# Patient Record
Sex: MALE | Race: WHITE | NOT HISPANIC OR LATINO | URBAN - METROPOLITAN AREA
[De-identification: names, ages, dates, MRNs, and addresses within clinical notes are randomized per-mention and may not be internally consistent; named-entity substitution may affect disease eponyms.]

---

## 2021-08-20 ENCOUNTER — OFFICE (AMBULATORY)
Dept: URBAN - METROPOLITAN AREA PATHOLOGY 4 | Facility: PATHOLOGY | Age: 41
End: 2021-08-20
Payer: COMMERCIAL

## 2021-08-20 ENCOUNTER — ON CAMPUS - OUTPATIENT (AMBULATORY)
Dept: URBAN - METROPOLITAN AREA HOSPITAL 2 | Facility: HOSPITAL | Age: 41
End: 2021-08-20
Payer: COMMERCIAL

## 2021-08-20 VITALS
DIASTOLIC BLOOD PRESSURE: 58 MMHG | RESPIRATION RATE: 16 BRPM | HEART RATE: 63 BPM | SYSTOLIC BLOOD PRESSURE: 115 MMHG | DIASTOLIC BLOOD PRESSURE: 73 MMHG | OXYGEN SATURATION: 97 % | HEART RATE: 75 BPM | DIASTOLIC BLOOD PRESSURE: 95 MMHG | DIASTOLIC BLOOD PRESSURE: 76 MMHG | SYSTOLIC BLOOD PRESSURE: 178 MMHG | SYSTOLIC BLOOD PRESSURE: 130 MMHG | RESPIRATION RATE: 18 BRPM | SYSTOLIC BLOOD PRESSURE: 133 MMHG | HEART RATE: 65 BPM | DIASTOLIC BLOOD PRESSURE: 62 MMHG | OXYGEN SATURATION: 96 % | OXYGEN SATURATION: 100 % | SYSTOLIC BLOOD PRESSURE: 113 MMHG | OXYGEN SATURATION: 99 % | DIASTOLIC BLOOD PRESSURE: 97 MMHG | HEART RATE: 66 BPM | HEART RATE: 68 BPM | HEART RATE: 56 BPM | SYSTOLIC BLOOD PRESSURE: 174 MMHG | WEIGHT: 200 LBS | TEMPERATURE: 97.5 F | HEIGHT: 73 IN | OXYGEN SATURATION: 98 % | OXYGEN SATURATION: 95 %

## 2021-08-20 DIAGNOSIS — K21.9 GASTRO-ESOPHAGEAL REFLUX DISEASE WITHOUT ESOPHAGITIS: ICD-10-CM

## 2021-08-20 LAB
GI HISTOLOGY: A. UNSPECIFIED: (no result)
GI HISTOLOGY: PDF REPORT: (no result)

## 2021-08-20 PROCEDURE — 88305 TISSUE EXAM BY PATHOLOGIST: CPT | Performed by: INTERNAL MEDICINE

## 2021-08-20 PROCEDURE — 43239 EGD BIOPSY SINGLE/MULTIPLE: CPT | Performed by: INTERNAL MEDICINE

## 2023-02-06 ENCOUNTER — LAB (OUTPATIENT)
Dept: FAMILY MEDICINE CLINIC | Facility: CLINIC | Age: 43
End: 2023-02-06
Payer: COMMERCIAL

## 2023-02-06 ENCOUNTER — OFFICE VISIT (OUTPATIENT)
Dept: FAMILY MEDICINE CLINIC | Facility: CLINIC | Age: 43
End: 2023-02-06
Payer: COMMERCIAL

## 2023-02-06 VITALS
HEIGHT: 73 IN | BODY MASS INDEX: 28.97 KG/M2 | OXYGEN SATURATION: 97 % | HEART RATE: 71 BPM | WEIGHT: 218.6 LBS | DIASTOLIC BLOOD PRESSURE: 54 MMHG | RESPIRATION RATE: 18 BRPM | SYSTOLIC BLOOD PRESSURE: 100 MMHG

## 2023-02-06 DIAGNOSIS — M25.552 LEFT HIP PAIN: ICD-10-CM

## 2023-02-06 DIAGNOSIS — G89.29 CHRONIC RIGHT SHOULDER PAIN: ICD-10-CM

## 2023-02-06 DIAGNOSIS — M25.511 CHRONIC RIGHT SHOULDER PAIN: ICD-10-CM

## 2023-02-06 DIAGNOSIS — Z00.00 ANNUAL PHYSICAL EXAM: Primary | ICD-10-CM

## 2023-02-06 DIAGNOSIS — K21.9 GASTROESOPHAGEAL REFLUX DISEASE WITHOUT ESOPHAGITIS: ICD-10-CM

## 2023-02-06 PROCEDURE — 36415 COLL VENOUS BLD VENIPUNCTURE: CPT | Performed by: STUDENT IN AN ORGANIZED HEALTH CARE EDUCATION/TRAINING PROGRAM

## 2023-02-06 PROCEDURE — 85027 COMPLETE CBC AUTOMATED: CPT | Performed by: STUDENT IN AN ORGANIZED HEALTH CARE EDUCATION/TRAINING PROGRAM

## 2023-02-06 PROCEDURE — 80053 COMPREHEN METABOLIC PANEL: CPT | Performed by: STUDENT IN AN ORGANIZED HEALTH CARE EDUCATION/TRAINING PROGRAM

## 2023-02-06 PROCEDURE — 99386 PREV VISIT NEW AGE 40-64: CPT | Performed by: STUDENT IN AN ORGANIZED HEALTH CARE EDUCATION/TRAINING PROGRAM

## 2023-02-06 PROCEDURE — 80061 LIPID PANEL: CPT | Performed by: STUDENT IN AN ORGANIZED HEALTH CARE EDUCATION/TRAINING PROGRAM

## 2023-02-06 RX ORDER — NICOTINE POLACRILEX 4 MG/1
20 GUM, CHEWING ORAL DAILY
Qty: 30 EACH | Refills: 11 | Status: SHIPPED | OUTPATIENT
Start: 2023-02-06

## 2023-02-06 NOTE — PROGRESS NOTES
"Chief Complaint  Chief Complaint   Patient presents with   • Establish Care     Hasn't had a physician in over 10 yrs   • Annual Exam     With labs     Subjective        Lazaro Pedraza is a 42 y.o. male who presents to [facility].    History of Present Illness  Here to establish care and have annual physical.      Exercise: plays softball occasionally, but nothing regular.  Mostly behind computer at work.  Thinks he has R shoulder injury and L hip flexor injury.    Diet: good about vegetables, less so fruit.  Drinks mostly water.    Sleep: no concerns.  Occasionally woke up by kids.    Family history: no colon ca that he is aware of.  Father with pancreatic cancer.  Mother with esophageal cancer.      He has acid reflux, has had a couple scopes done.  Last scope was 2-3 yrs ago he thinks.  He takes omeprazole 20 mg daily.      Previously on allergy shots but quit around 2020.      Works for [employer], is a .      Objective   /54   Pulse 71   Resp 18   Ht 185.4 cm (73\")   Wt 99.2 kg (218 lb 9.6 oz)   SpO2 97%   BMI 28.84 kg/m²     Estimated body mass index is 28.84 kg/m² as calculated from the following:    Height as of this encounter: 185.4 cm (73\").    Weight as of this encounter: 99.2 kg (218 lb 9.6 oz).     Physical Exam   GEN: In no acute distress, non toxic appearing  HEENT: Pupils equal and reactive to light, sclera clear. Mucous membranes moist. Oropharynx without erythema or exudate. No cervical or submandibular lymphadenopathy.  TMs WNL.    CV: Regular rate and rhythm, no murmurs, 2+ peripheral pulses  RESP: Lungs clear to auscultation anteriorly and posteriorly in all lung fields bilaterally.  NEURO: AAO to person, place, and time. CN 2-12 intact grossly.   PSYCH: Affect normal, insight fair     PHQ-2 Depression Screening  Little interest or pleasure in doing things? 0-->not at all   Feeling down, depressed, or hopeless? 0-->not at all   PHQ-2 " Total Score 0      Result Review :              Assessment and Plan     Diagnoses and all orders for this visit:    1. Annual physical exam (Primary)  Overall reassuring exam.  Discussed healthy diet and exercise recs.  Labs as below.    Colon ca screening to start at 46 yo.  Prostate ca screening to start at 49 yo.  F/u in 1 yr or sooner prn.    -     CBC (No Diff)  -     Lipid Panel  -     Comprehensive Metabolic Panel    2. Gastroesophageal reflux disease without esophagitis  -     Omeprazole 20 MG tablet delayed-release; Take 20 mg by mouth Daily.  Dispense: 30 each; Refill: 11    3. Chronic right shoulder pain  Gave handout of band exercises for rotator cuff, likely tendinitis vs partial tear.    4. Left hip pain  Likely R hip flexor strain, gave exercise handout       Follow Up     Return in about 1 year (around 2/6/2024) for Annual physical.

## 2023-02-07 ENCOUNTER — TELEPHONE (OUTPATIENT)
Dept: FAMILY MEDICINE CLINIC | Facility: CLINIC | Age: 43
End: 2023-02-07

## 2023-02-07 LAB
ALBUMIN SERPL-MCNC: 4.5 G/DL (ref 3.5–5.2)
ALBUMIN/GLOB SERPL: 2 G/DL
ALP SERPL-CCNC: 68 U/L (ref 39–117)
ALT SERPL W P-5'-P-CCNC: 46 U/L (ref 1–41)
ANION GAP SERPL CALCULATED.3IONS-SCNC: 9 MMOL/L (ref 5–15)
AST SERPL-CCNC: 23 U/L (ref 1–40)
BILIRUB SERPL-MCNC: 0.4 MG/DL (ref 0–1.2)
BUN SERPL-MCNC: 14 MG/DL (ref 6–20)
BUN/CREAT SERPL: 11.1 (ref 7–25)
CALCIUM SPEC-SCNC: 9.1 MG/DL (ref 8.6–10.5)
CHLORIDE SERPL-SCNC: 104 MMOL/L (ref 98–107)
CHOLEST SERPL-MCNC: 260 MG/DL (ref 0–200)
CO2 SERPL-SCNC: 28 MMOL/L (ref 22–29)
CREAT SERPL-MCNC: 1.26 MG/DL (ref 0.76–1.27)
DEPRECATED RDW RBC AUTO: 42.7 FL (ref 37–54)
EGFRCR SERPLBLD CKD-EPI 2021: 73.5 ML/MIN/1.73
ERYTHROCYTE [DISTWIDTH] IN BLOOD BY AUTOMATED COUNT: 12.8 % (ref 12.3–15.4)
GLOBULIN UR ELPH-MCNC: 2.2 GM/DL
GLUCOSE SERPL-MCNC: 84 MG/DL (ref 65–99)
HCT VFR BLD AUTO: 44.5 % (ref 37.5–51)
HDLC SERPL-MCNC: 34 MG/DL (ref 40–60)
HGB BLD-MCNC: 14.2 G/DL (ref 13–17.7)
LDLC SERPL CALC-MCNC: 172 MG/DL (ref 0–100)
LDLC/HDLC SERPL: 5 {RATIO}
MCH RBC QN AUTO: 29.3 PG (ref 26.6–33)
MCHC RBC AUTO-ENTMCNC: 31.9 G/DL (ref 31.5–35.7)
MCV RBC AUTO: 91.9 FL (ref 79–97)
PLATELET # BLD AUTO: 234 10*3/MM3 (ref 140–450)
PMV BLD AUTO: 11.1 FL (ref 6–12)
POTASSIUM SERPL-SCNC: 4 MMOL/L (ref 3.5–5.2)
PROT SERPL-MCNC: 6.7 G/DL (ref 6–8.5)
RBC # BLD AUTO: 4.84 10*6/MM3 (ref 4.14–5.8)
SODIUM SERPL-SCNC: 141 MMOL/L (ref 136–145)
TRIGL SERPL-MCNC: 280 MG/DL (ref 0–150)
VLDLC SERPL-MCNC: 54 MG/DL (ref 5–40)
WBC NRBC COR # BLD: 5.69 10*3/MM3 (ref 3.4–10.8)

## 2023-02-07 NOTE — TELEPHONE ENCOUNTER
PATIENT STATES DR. COHEN WANTS HIM TO HAVE REPEAT LABS FROM YESTERDAY IN 6 MONTHS, ATTEMPTED TO WARM TRANSFER BUT WAS UNSUCCESSFUL PLEASE CALL PATIENT TO SCHEDULE.     PATIENT> [phone]

## 2023-02-07 NOTE — PROGRESS NOTES
I spoke with Freddy relaying Dr. Long's message and let him know the following about his labs: his kidney function and blood counts were normal.  One of his liver enzymes and his cholesterol were elevated.  I think these two are connected and likely related to diet and exercise.  For both of these I would recommend decreasing fried / greasy foods, saturated fats in whole dairy and red meats, increasing fruits and vegetables, and increasing exercise as he is able with the goal of 150 minutes per week of cardio exercise.  I would like to recheck his cholesterol in 6 months just to make sure we are headed the right direction.  If not, we may discuss starting a medication to help lower his cholesterol with the goal of decreasing risk of stroke and heart disease. He said ok and he will work on diet and exercise.

## 2023-08-18 ENCOUNTER — OFFICE VISIT (OUTPATIENT)
Dept: FAMILY MEDICINE CLINIC | Facility: CLINIC | Age: 43
End: 2023-08-18
Payer: COMMERCIAL

## 2023-08-18 VITALS
RESPIRATION RATE: 16 BRPM | WEIGHT: 224 LBS | OXYGEN SATURATION: 97 % | DIASTOLIC BLOOD PRESSURE: 60 MMHG | HEART RATE: 71 BPM | TEMPERATURE: 97.8 F | HEIGHT: 73 IN | SYSTOLIC BLOOD PRESSURE: 118 MMHG | BODY MASS INDEX: 29.69 KG/M2

## 2023-08-18 DIAGNOSIS — G89.29 CHRONIC BILATERAL LOW BACK PAIN WITHOUT SCIATICA: Primary | ICD-10-CM

## 2023-08-18 DIAGNOSIS — M54.50 CHRONIC BILATERAL LOW BACK PAIN WITHOUT SCIATICA: Primary | ICD-10-CM

## 2023-08-18 PROCEDURE — 99214 OFFICE O/P EST MOD 30 MIN: CPT | Performed by: STUDENT IN AN ORGANIZED HEALTH CARE EDUCATION/TRAINING PROGRAM

## 2023-08-18 RX ORDER — METHOCARBAMOL 750 MG/1
750 TABLET, FILM COATED ORAL NIGHTLY PRN
Qty: 21 TABLET | Refills: 0 | Status: SHIPPED | OUTPATIENT
Start: 2023-08-18

## 2023-08-18 RX ORDER — NAPROXEN 500 MG/1
500 TABLET ORAL 2 TIMES DAILY WITH MEALS
Qty: 28 TABLET | Refills: 0 | Status: SHIPPED | OUTPATIENT
Start: 2023-08-18 | End: 2023-09-01

## 2023-08-18 NOTE — PROGRESS NOTES
"Chief Complaint  Chief Complaint   Patient presents with    Back Pain     Lumber back pain      Subjective        aLzaro Pedraza is a 43 y.o. male who presents to [facility].    History of Present Illness  Freddy is here to discuss lower back pain.  Always had issues with low back pain.  Sitting still it gets stiff.  He irritated it lifting heavy things within the last couple weeks.  He has taken some intermittent hydrocodone.  No pain or radiation down his legs.  He does have some pain down into his upper buttocks.  No numbness / tingling into either foot.  He felt like the vertebra in his back just shifted straight down.      Last XR of his back was pre-covid (so prior to 2019).  He had seen a chiropractor previously that was helpful at the time.      Objective   /60   Pulse 71   Temp 97.8 øF (36.6 øC)   Resp 16   Ht 185.4 cm (73\")   Wt 102 kg (224 lb)   SpO2 97%   BMI 29.55 kg/mý     Estimated body mass index is 29.55 kg/mý as calculated from the following:    Height as of this encounter: 185.4 cm (73\").    Weight as of this encounter: 102 kg (224 lb).     Physical Exam   GEN: In no acute distress, non toxic appearing  MSK: Mild tenderness to palpation in lower lumbar spine.  No palpable abnormalities.  No step-offs.  No tenderness to palpation upper gluteal.  Negative straight leg raise bilaterally.    PHQ-2 Depression Screening  Little interest or pleasure in doing things? 1-->several days   Feeling down, depressed, or hopeless? 1-->several days   PHQ-2 Total Score 5      Result Review :              Assessment and Plan     Diagnoses and all orders for this visit:    1. Chronic bilateral low back pain without sciatica (Primary)  Obtain x-ray to evaluate for possible bony abnormality.  Refer for physical therapy.  Start twice daily naproxen with nightly Robaxin as needed.  Follow-up in 6 weeks, if no improvement at that time consider MRI.  -     XR Spine Lumbar " Complete With Flex & Ext; Future  -     Ambulatory Referral to Physical Therapy Evaluate and treat  -     naproxen (Naprosyn) 500 MG tablet; Take 1 tablet by mouth 2 (Two) Times a Day With Meals for 14 days.  Dispense: 28 tablet; Refill: 0  -     methocarbamol (ROBAXIN) 750 MG tablet; Take 1 tablet by mouth At Night As Needed for Muscle Spasms.  Dispense: 21 tablet; Refill: 0         Follow Up     Return in about 6 weeks (around 9/29/2023) for back pain f/u.

## 2023-08-22 DIAGNOSIS — G89.29 CHRONIC BILATERAL LOW BACK PAIN WITHOUT SCIATICA: ICD-10-CM

## 2023-08-22 DIAGNOSIS — M54.50 CHRONIC BILATERAL LOW BACK PAIN WITHOUT SCIATICA: ICD-10-CM

## 2023-08-29 ENCOUNTER — TREATMENT (OUTPATIENT)
Dept: PHYSICAL THERAPY | Facility: CLINIC | Age: 43
End: 2023-08-29
Payer: COMMERCIAL

## 2023-08-29 DIAGNOSIS — G89.29 CHRONIC BILATERAL LOW BACK PAIN WITHOUT SCIATICA: Primary | ICD-10-CM

## 2023-08-29 DIAGNOSIS — M54.50 CHRONIC BILATERAL LOW BACK PAIN WITHOUT SCIATICA: Primary | ICD-10-CM

## 2023-08-29 PROCEDURE — 97161 PT EVAL LOW COMPLEX 20 MIN: CPT | Performed by: PHYSICAL THERAPIST

## 2023-08-29 PROCEDURE — 97110 THERAPEUTIC EXERCISES: CPT | Performed by: PHYSICAL THERAPIST

## 2023-08-29 PROCEDURE — 97112 NEUROMUSCULAR REEDUCATION: CPT | Performed by: PHYSICAL THERAPIST

## 2023-08-29 NOTE — PROGRESS NOTES
Physical Therapy Initial Evaluation and Plan of Care  [address] [place] [address]      Patient: Lazaro Pedraza   : 1980  Diagnosis/ICD-10 Code:  Chronic bilateral low back pain without sciatica [M54.50, G89.29]  Referring practitioner: Blayne Long DO  Date of Initial Visit: 2023  Today's Date: 2023  Patient seen for 1 sessions               Visit Diagnoses:  Visit Diagnosis       ICD-10-CM ICD-9-CM   1. Chronic bilateral low back pain without sciatica  M54.50 724.2     G89.29 338.29            Subjective Questionnaire: Oswestry: 16%        Subjective 42 yo male with c/o LBP. Pt with a long history of LBP with exacerbation in pain a few months ago when clearing downed trees. Pain has improved somewhat with muscle relaxers. Primary pain is along the central lumbar spine, just above the belt line. Symptoms improve with NSAIDs and muscle relaxers. Symptoms worsen with prolonged positioning, lifting, bending, playing softball. 2/10 pain now and 9/10 at worst. Denies LE symptoms. No history of therapy.  MD follow up:23  Social hx: Plays softball recreationally, deskwork job in IT. Has four young children to care for.        Objective            Active Range of Motion      Lumbar   Normal active range of motion  Flexion: with pain     Strength/Myotome Testing      Lumbar   Left   Normal strength     Right   Normal strength     Tests      Lumbar      Left   Positive passive lumbar instability and passive SLR.      Right   Positive passive lumbar instability and passive SLR.     Positive Gowers sign and piriformis test  Hamstring length limited bilaterally, as is hip IR PROM  Posture is guarded     Assessment & Plan         Assessment  Impairments: abnormal coordination, abnormal gait, abnormal muscle tone, abnormal or restricted ROM, activity intolerance, impaired physical strength, lacks appropriate home exercise program and pain with function   Functional  limitations: carrying objects, lifting, sleeping, walking, pulling, pushing, uncomfortable because of pain, moving in bed, sitting, standing, stooping and unable to perform repetitive tasks   Assessment details: 42 yo male with c/o LBP. Pt is with a good response to treatment this date and would benefit from further evaluation and treatment to address the above impairments.     Prognosis: good     Goals  Plan Goals: Short term goals, 1 week: Tolerate HEP progression.  Voice compliance with activity modification.  Report improvement in symptoms.     Long term goals, 5 weeks: Improve BRENDA score to 6%.  AROM to be wfl and non provocative.  Show good PPT control with stabilization activities.  Rate worst pain at 4/10 with recreational activities.  Independent with HEP.     Plan  Therapy options: will be seen for skilled therapy services  Other planned modality interventions: modalities prn  Planned therapy interventions: abdominal trunk stabilization, body mechanics training, flexibility, functional ROM exercises, home exercise program, manual therapy, neuromuscular re-education, postural training, strengthening, stretching and therapeutic activities  Frequency: Twice weekly  Duration: 5 weeks  Treatment plan discussed with: patient        History # of Personal Factors and/or Comorbidities: LOW (0)  Examination of Body System(s): # of elements: LOW (1-2)  Clinical Presentation: STABLE   Clinical Decision Making: LOW       Timed:                                                                           Manual Therapy:                 mins  76642;                  Therapeutic Exercise:    23     mins  79998;     Neuromuscular Amy:    8    mins  47039;    Therapeutic Activity:           mins  61743;     Gait Training:                      mins  10381;     Ultrasound:                          mins  42274;    Ionto                                   mins   77439  Self Care                            mins   53460      Un-Timed:  Electrical Stimulation:         mins  80072 ( );  Traction                               mins 86136  Low Eval                        15     Mins  64581  Mod Eval                             Mins  55758  High Eval                            Mins  22263  Re-Eval                               mins  77277           Timed Treatment:   31   mins   Total Treatment:     46   mins       PT SIGNATURE: Akash Castro PT, DPT, OCS  [place] license: [id number]  DATE TREATMENT INITIATED: 8/29/2023     Certification Period: @TDA @thru 11/26/2023  I certify that the therapy services are furnished while this patient is under my care.  The services outlined above are required for this patient and will be reviewed every 90 days.                PHYSICIAN: _____________________________                          Blayne Long, DO                                           DATE:      Please sign and return via fax to [unfilled] . Thank you, [facility].

## 2023-09-06 ENCOUNTER — TREATMENT (OUTPATIENT)
Dept: PHYSICAL THERAPY | Facility: CLINIC | Age: 43
End: 2023-09-06
Payer: COMMERCIAL

## 2023-09-06 DIAGNOSIS — M54.50 CHRONIC BILATERAL LOW BACK PAIN WITHOUT SCIATICA: Primary | ICD-10-CM

## 2023-09-06 DIAGNOSIS — G89.29 CHRONIC BILATERAL LOW BACK PAIN WITHOUT SCIATICA: Primary | ICD-10-CM

## 2023-09-06 PROCEDURE — 97110 THERAPEUTIC EXERCISES: CPT | Performed by: PHYSICAL THERAPIST

## 2023-09-06 PROCEDURE — 97112 NEUROMUSCULAR REEDUCATION: CPT | Performed by: PHYSICAL THERAPIST

## 2023-09-06 NOTE — PROGRESS NOTES
Physical Therapy Daily Treatment Note  [facility]  [address] [place] [address]     Patient: Lazaro Pedraza   : 1980   Referring practitioner: Blayne Long DO  Date of initial visit: Type: THERAPY  Noted: 2023   Today's date: 2023   Patient seen for 2 visits    Visit Diagnoses:    ICD-10-CM ICD-9-CM   1. Chronic bilateral low back pain without sciatica  M54.50 724.2    G89.29 338.29       Subjective   Pt reports: Exacerbation in central pain after sitting on a bucket, tossing softballs for batting practice last night. HEP going well.      Objective     See Exercise, Manual, and Modality Logs for complete treatment.     Patient Education: HEP    Assessment/Plan Less pain post visit, tolerating progression.      Progress per Plan of Care            Timed:         Manual Therapy:         mins  48518;     Therapeutic Exercise:    23     mins  34180;     Neuromuscular Amy:    8    mins  62831;    Therapeutic Activity:          mins  65031;     Gait Training:           mins  13700;     Ultrasound:          mins  57010;    Ionto                                   mins   14722  Self Care                            mins   38962    Un-Timed:  Electrical Stimulation:         mins  71892 ( );  Traction          mins 58157  Low Eval          Mins  09736  Mod Eval          Mins  19190  High Eval                            Mins  07577  Re-Eval                               mins  01644    Timed Treatment:   31   mins   Total Treatment:     31   mins      Akash Castro, PT, DPT, OCS  [place] license: [id number]  Physical Therapist

## 2023-09-13 ENCOUNTER — TREATMENT (OUTPATIENT)
Dept: PHYSICAL THERAPY | Facility: CLINIC | Age: 43
End: 2023-09-13
Payer: COMMERCIAL

## 2023-09-13 DIAGNOSIS — G89.29 CHRONIC BILATERAL LOW BACK PAIN WITHOUT SCIATICA: Primary | ICD-10-CM

## 2023-09-13 DIAGNOSIS — M54.50 CHRONIC BILATERAL LOW BACK PAIN WITHOUT SCIATICA: Primary | ICD-10-CM

## 2023-09-13 PROCEDURE — 97112 NEUROMUSCULAR REEDUCATION: CPT | Performed by: PHYSICAL THERAPIST

## 2023-09-13 PROCEDURE — 97140 MANUAL THERAPY 1/> REGIONS: CPT | Performed by: PHYSICAL THERAPIST

## 2023-09-13 PROCEDURE — 97110 THERAPEUTIC EXERCISES: CPT | Performed by: PHYSICAL THERAPIST

## 2023-09-13 NOTE — PROGRESS NOTES
Physical Therapy Daily Treatment Note  [facility]  [address] [place] [address]     Patient: Lazaro Pedraza   : 1980   Referring practitioner: Blayne Long DO  Date of initial visit: Type: THERAPY  Noted: 2023   Today's date: 2023   Patient seen for 3 visits    Visit Diagnoses:    ICD-10-CM ICD-9-CM   1. Chronic bilateral low back pain without sciatica  M54.50 724.2    G89.29 338.29       Subjective   Pt reports: Central belt line pain today. HEP going well but still challenged with PPT.      Objective     See Exercise, Manual, and Modality Logs for complete treatment.     Patient Education: HEP    Assessment/Plan Fatigued post visit, tolerating progression well.      Progress per Plan of Care            Timed:         Manual Therapy:    8     mins  74920;     Therapeutic Exercise:    10     mins  41679;     Neuromuscular Amy:    15    mins  90221;    Therapeutic Activity:          mins  12701;     Gait Training:           mins  72401;     Ultrasound:          mins  80419;    Ionto                                   mins   50683  Self Care                            mins   22347    Un-Timed:  Electrical Stimulation:         mins  66437 ( );  Traction          mins 89685  Low Eval          Mins  54148  Mod Eval          Mins  44901  High Eval                            Mins  40365  Re-Eval                               mins  06791    Timed Treatment:   33   mins   Total Treatment:     33   mins      Akash Castro, PT, DPT, OCS  [place] license: [id number]  Physical Therapist

## 2023-09-18 ENCOUNTER — TREATMENT (OUTPATIENT)
Dept: PHYSICAL THERAPY | Facility: CLINIC | Age: 43
End: 2023-09-18
Payer: COMMERCIAL

## 2023-09-18 DIAGNOSIS — M54.50 CHRONIC BILATERAL LOW BACK PAIN WITHOUT SCIATICA: Primary | ICD-10-CM

## 2023-09-18 DIAGNOSIS — G89.29 CHRONIC BILATERAL LOW BACK PAIN WITHOUT SCIATICA: Primary | ICD-10-CM

## 2023-09-18 PROCEDURE — 97112 NEUROMUSCULAR REEDUCATION: CPT | Performed by: PHYSICAL THERAPIST

## 2023-09-18 PROCEDURE — 97110 THERAPEUTIC EXERCISES: CPT | Performed by: PHYSICAL THERAPIST

## 2023-09-18 NOTE — PROGRESS NOTES
Physical Therapy Daily Treatment Note  [facility]  [address] [place] [address]     Patient: Lazaro Pedraza   : 1980   Referring practitioner: Blayne Long DO  Date of initial visit: Type: THERAPY  Noted: 2023   Today's date: 2023   Patient seen for 4 visits    Visit Diagnoses:    ICD-10-CM ICD-9-CM   1. Chronic bilateral low back pain without sciatica  M54.50 724.2    G89.29 338.29       Subjective   Pt reports: LBP improving, HEP going well.      Objective     See Exercise, Manual, and Modality Logs for complete treatment.     Patient Education: HEP    Assessment/Plan Fatigued post visit. Tolerating progression well.      Progress per Plan of Care            Timed:         Manual Therapy:         mins  56106;     Therapeutic Exercise:    25     mins  86021;     Neuromuscular Amy:    15    mins  92696;    Therapeutic Activity:          mins  79361;     Gait Training:           mins  46945;     Ultrasound:          mins  14804;    Ionto                                   mins   48281  Self Care                            mins   80183    Un-Timed:  Electrical Stimulation:         mins  82756 ( );  Traction          mins 20006  Low Eval          Mins  33386  Mod Eval          Mins  80756  High Eval                            Mins  20597  Re-Eval                               mins  43838    Timed Treatment:   40   mins   Total Treatment:     40   mins      Akash Castro PT, DPT, OCS  [place] license: [id number]  Physical Therapist

## 2023-09-27 ENCOUNTER — TREATMENT (OUTPATIENT)
Dept: PHYSICAL THERAPY | Facility: CLINIC | Age: 43
End: 2023-09-27
Payer: COMMERCIAL

## 2023-09-27 DIAGNOSIS — M54.50 CHRONIC BILATERAL LOW BACK PAIN WITHOUT SCIATICA: Primary | ICD-10-CM

## 2023-09-27 DIAGNOSIS — G89.29 CHRONIC BILATERAL LOW BACK PAIN WITHOUT SCIATICA: Primary | ICD-10-CM

## 2023-09-27 PROCEDURE — 97110 THERAPEUTIC EXERCISES: CPT | Performed by: PHYSICAL THERAPIST

## 2023-09-27 PROCEDURE — 97112 NEUROMUSCULAR REEDUCATION: CPT | Performed by: PHYSICAL THERAPIST

## 2023-09-27 NOTE — PROGRESS NOTES
Re-Assessment / Re-Certification        Patient: Lazaro Pedraza   : 1980  Diagnosis/ICD-10 Code:  Chronic bilateral low back pain without sciatica [M54.50, G89.29]  Referring practitioner: Blayne Long DO  Date of Initial Visit: Type: THERAPY  Noted: 2023  Today's Date: 2023  Patient seen for 5 sessions      Subjective:     Subjective Questionnaire: Oswestry: 12%  Clinical Progress: improved  Home Program Compliance: Yes  Treatment has included: therapeutic exercise, neuromuscular re-education, and manual therapy    Subjective Pt now rates worst pain at 2/10, primarily with prolonged sitting. Pt feels he is independent with his HEP to this point.  Objective   Active Range of Motion      Lumbar   Normal active range of motion        Tests      Lumbar      Left   Positive passive lumbar instability and passive SLR.      Right   Positive passive lumbar instability and passive SLR.     Negative Gowers sign and piriformis test  Hamstring length and hip mobility wfl  Assessment/Plan  Pt has made good progress to this point re: mobility, flexibility, and reported pain. Pt feels he is ready to try independent HEP.  Short term goals, 1 week: Tolerate HEP progression. met  Voice compliance with activity modification. met  Report improvement in symptoms. met     Long term goals, 5 weeks: Improve BRENDA score to 6%. progressing  AROM to be wfl and non provocative. met  Show good PPT control with stabilization activities. met  Rate worst pain at 4/10 with recreational activities. met  Independent with HEP.met    Pending PCP follow up, pt to try 2-3 weeks of independent HEP then follow up here if needed. Will DC if this goes well.    Akash Castro, PT, DPT, OCS  [place] license: [id number]  Physical Therapist          Timed:         Manual Therapy:         mins  52730;     Therapeutic Exercise:    23     mins  00990;     Neuromuscular Amy:    10    mins  10452;    Therapeutic Activity:          mins  48295;      Gait Training:           mins  18326;     Ultrasound:          mins  85985;    Ionto                                   mins   34533  Self Care                            mins   29478    Un-Timed:  Electrical Stimulation:         mins  73899 ( );  Traction          mins 75771  Low Eval          Mins  44935  Mod Eval          Mins  69246  High Eval                            Mins  22556  Re-Eval                               mins  03202      Timed Treatment:   33   mins   Total Treatment:     33   mins

## 2023-09-29 ENCOUNTER — LAB (OUTPATIENT)
Dept: FAMILY MEDICINE CLINIC | Facility: CLINIC | Age: 43
End: 2023-09-29
Payer: COMMERCIAL

## 2023-09-29 ENCOUNTER — OFFICE VISIT (OUTPATIENT)
Dept: FAMILY MEDICINE CLINIC | Facility: CLINIC | Age: 43
End: 2023-09-29
Payer: COMMERCIAL

## 2023-09-29 VITALS
OXYGEN SATURATION: 97 % | WEIGHT: 221.8 LBS | HEART RATE: 58 BPM | DIASTOLIC BLOOD PRESSURE: 67 MMHG | SYSTOLIC BLOOD PRESSURE: 112 MMHG | RESPIRATION RATE: 18 BRPM | HEIGHT: 73 IN | BODY MASS INDEX: 29.4 KG/M2

## 2023-09-29 DIAGNOSIS — G47.10 HYPERSOMNIA: ICD-10-CM

## 2023-09-29 DIAGNOSIS — G89.29 CHRONIC BILATERAL LOW BACK PAIN WITHOUT SCIATICA: Primary | ICD-10-CM

## 2023-09-29 DIAGNOSIS — R74.8 ELEVATED LIVER ENZYMES: ICD-10-CM

## 2023-09-29 DIAGNOSIS — R53.83 OTHER FATIGUE: ICD-10-CM

## 2023-09-29 DIAGNOSIS — R06.83 SNORING: ICD-10-CM

## 2023-09-29 DIAGNOSIS — E78.2 MIXED HYPERLIPIDEMIA: ICD-10-CM

## 2023-09-29 DIAGNOSIS — M54.50 CHRONIC BILATERAL LOW BACK PAIN WITHOUT SCIATICA: Primary | ICD-10-CM

## 2023-09-29 PROCEDURE — 80053 COMPREHEN METABOLIC PANEL: CPT | Performed by: STUDENT IN AN ORGANIZED HEALTH CARE EDUCATION/TRAINING PROGRAM

## 2023-09-29 PROCEDURE — 84443 ASSAY THYROID STIM HORMONE: CPT | Performed by: STUDENT IN AN ORGANIZED HEALTH CARE EDUCATION/TRAINING PROGRAM

## 2023-09-29 PROCEDURE — 84403 ASSAY OF TOTAL TESTOSTERONE: CPT | Performed by: STUDENT IN AN ORGANIZED HEALTH CARE EDUCATION/TRAINING PROGRAM

## 2023-09-29 PROCEDURE — 36415 COLL VENOUS BLD VENIPUNCTURE: CPT

## 2023-09-29 PROCEDURE — 99214 OFFICE O/P EST MOD 30 MIN: CPT | Performed by: STUDENT IN AN ORGANIZED HEALTH CARE EDUCATION/TRAINING PROGRAM

## 2023-09-29 PROCEDURE — 80061 LIPID PANEL: CPT | Performed by: STUDENT IN AN ORGANIZED HEALTH CARE EDUCATION/TRAINING PROGRAM

## 2023-09-29 RX ORDER — IBUPROFEN 200 MG
TABLET ORAL
COMMUNITY
Start: 2023-09-14

## 2023-09-29 NOTE — PROGRESS NOTES
"Chief Complaint  Chief Complaint   Patient presents with    Back Pain     Subjective        Lazaro Pedraza is a 43 y.o. male who presents to [facility].    History of Present Illness  Seen on 8/18 for chronic low back pain.  XR was unremarkable.  He was given naproxen w/ nightly robaxin and referred to physical therapy.  Pain is improved with PT.  If he sits for long periods of time it starts to bother him.      His cholesterol was elevated in February along with one of his liver enzymes.  We are going to recheck these today.  He is also reporting significant fatigue and would like his thyroid and testosterone level checked.  He is always tired.  Could lay down at any point during the day and take a nap.  He does snore.  No pauses in breathing that his wife has mentioned.  He doesn't feel well rested when he wakes up in the morning.      Objective   /67   Pulse 58   Resp 18   Ht 185.4 cm (73\")   Wt 101 kg (221 lb 12.8 oz)   SpO2 97%   BMI 29.26 kg/m²     Estimated body mass index is 29.26 kg/m² as calculated from the following:    Height as of this encounter: 185.4 cm (73\").    Weight as of this encounter: 101 kg (221 lb 12.8 oz).     Physical Exam   GEN: In no acute distress, non toxic appearing  NEURO: AAO to person, place, and time. CN 2-12 intact grossly.  PSYCH: Affect normal, insight fair     Result Review :              Assessment and Plan     Diagnoses and all orders for this visit:    1. Chronic bilateral low back pain without sciatica (Primary)  XR reassuring.  Improving with PT and HEP.  Continue HEP.  Update me / PT if any worsening.    2. Mixed hyperlipidemia  Recheck lipids today, he is fasting.    3. Elevated liver enzymes  Recheck CMP today.    4. Other fatigue  Check TSH and testosterone.    If normal consider home sleep study.        UPDATE: labs returned normal other than cholesterol which is unlikely to contribute to fatigue.  I am suspicious of " sleep apnea.  Will order home sleep study as we discussed.

## 2023-09-30 LAB
ALBUMIN SERPL-MCNC: 4.3 G/DL (ref 3.5–5.2)
ALBUMIN/GLOB SERPL: 1.7 G/DL
ALP SERPL-CCNC: 65 U/L (ref 39–117)
ALT SERPL W P-5'-P-CCNC: 33 U/L (ref 1–41)
ANION GAP SERPL CALCULATED.3IONS-SCNC: 9.8 MMOL/L (ref 5–15)
AST SERPL-CCNC: 25 U/L (ref 1–40)
BILIRUB SERPL-MCNC: 0.6 MG/DL (ref 0–1.2)
BUN SERPL-MCNC: 13 MG/DL (ref 6–20)
BUN/CREAT SERPL: 11.8 (ref 7–25)
CALCIUM SPEC-SCNC: 9.3 MG/DL (ref 8.6–10.5)
CHLORIDE SERPL-SCNC: 104 MMOL/L (ref 98–107)
CHOLEST SERPL-MCNC: 211 MG/DL (ref 0–200)
CO2 SERPL-SCNC: 26.2 MMOL/L (ref 22–29)
CREAT SERPL-MCNC: 1.1 MG/DL (ref 0.76–1.27)
EGFRCR SERPLBLD CKD-EPI 2021: 86 ML/MIN/1.73
GLOBULIN UR ELPH-MCNC: 2.6 GM/DL
GLUCOSE SERPL-MCNC: 78 MG/DL (ref 65–99)
HDLC SERPL-MCNC: 34 MG/DL (ref 40–60)
LDLC SERPL CALC-MCNC: 148 MG/DL (ref 0–100)
LDLC/HDLC SERPL: 4.26 {RATIO}
POTASSIUM SERPL-SCNC: 3.9 MMOL/L (ref 3.5–5.2)
PROT SERPL-MCNC: 6.9 G/DL (ref 6–8.5)
SODIUM SERPL-SCNC: 140 MMOL/L (ref 136–145)
TESTOST SERPL-MCNC: 405 NG/DL (ref 249–836)
TRIGL SERPL-MCNC: 161 MG/DL (ref 0–150)
TSH SERPL DL<=0.05 MIU/L-ACNC: 2.25 UIU/ML (ref 0.27–4.2)
VLDLC SERPL-MCNC: 29 MG/DL (ref 5–40)

## 2023-10-03 DIAGNOSIS — G89.29 CHRONIC BILATERAL LOW BACK PAIN WITHOUT SCIATICA: ICD-10-CM

## 2023-10-03 DIAGNOSIS — M54.50 CHRONIC BILATERAL LOW BACK PAIN WITHOUT SCIATICA: ICD-10-CM

## 2023-10-03 RX ORDER — NAPROXEN 500 MG/1
TABLET ORAL
Qty: 28 TABLET | Refills: 0 | OUTPATIENT
Start: 2023-10-03

## 2023-10-23 DIAGNOSIS — R06.83 SNORING: ICD-10-CM

## 2023-10-23 DIAGNOSIS — E66.3 OVERWEIGHT WITH BODY MASS INDEX (BMI) 25.0-29.9: ICD-10-CM

## 2023-10-23 DIAGNOSIS — G47.8 NON-RESTORATIVE SLEEP: ICD-10-CM

## 2023-10-23 DIAGNOSIS — G47.10 HYPERSOMNIA: Primary | ICD-10-CM

## 2023-11-08 DIAGNOSIS — R06.83 SNORING: ICD-10-CM

## 2023-11-08 DIAGNOSIS — G47.10 HYPERSOMNIA: Primary | ICD-10-CM

## 2023-11-08 DIAGNOSIS — E66.3 OVERWEIGHT WITH BODY MASS INDEX (BMI) 25.0-29.9: ICD-10-CM

## 2023-11-08 DIAGNOSIS — G47.8 NON-RESTORATIVE SLEEP: ICD-10-CM

## 2024-02-12 ENCOUNTER — LAB (OUTPATIENT)
Dept: FAMILY MEDICINE CLINIC | Facility: CLINIC | Age: 44
End: 2024-02-12
Payer: COMMERCIAL

## 2024-02-12 ENCOUNTER — OFFICE VISIT (OUTPATIENT)
Dept: FAMILY MEDICINE CLINIC | Facility: CLINIC | Age: 44
End: 2024-02-12
Payer: COMMERCIAL

## 2024-02-12 VITALS
OXYGEN SATURATION: 96 % | DIASTOLIC BLOOD PRESSURE: 64 MMHG | BODY MASS INDEX: 29.53 KG/M2 | HEART RATE: 68 BPM | SYSTOLIC BLOOD PRESSURE: 110 MMHG | WEIGHT: 222.8 LBS | RESPIRATION RATE: 16 BRPM | HEIGHT: 73 IN

## 2024-02-12 DIAGNOSIS — Z00.00 ANNUAL PHYSICAL EXAM: Primary | ICD-10-CM

## 2024-02-12 DIAGNOSIS — E78.2 MIXED HYPERLIPIDEMIA: ICD-10-CM

## 2024-02-12 LAB
DEPRECATED RDW RBC AUTO: 41.5 FL (ref 37–54)
ERYTHROCYTE [DISTWIDTH] IN BLOOD BY AUTOMATED COUNT: 12.9 % (ref 12.3–15.4)
HCT VFR BLD AUTO: 42.4 % (ref 37.5–51)
HGB BLD-MCNC: 14.4 G/DL (ref 13–17.7)
MCH RBC QN AUTO: 30.4 PG (ref 26.6–33)
MCHC RBC AUTO-ENTMCNC: 34 G/DL (ref 31.5–35.7)
MCV RBC AUTO: 89.6 FL (ref 79–97)
PLATELET # BLD AUTO: 252 10*3/MM3 (ref 140–450)
PMV BLD AUTO: 10.6 FL (ref 6–12)
RBC # BLD AUTO: 4.73 10*6/MM3 (ref 4.14–5.8)
WBC NRBC COR # BLD AUTO: 6.46 10*3/MM3 (ref 3.4–10.8)

## 2024-02-12 PROCEDURE — 80053 COMPREHEN METABOLIC PANEL: CPT | Performed by: STUDENT IN AN ORGANIZED HEALTH CARE EDUCATION/TRAINING PROGRAM

## 2024-02-12 PROCEDURE — 85027 COMPLETE CBC AUTOMATED: CPT | Performed by: STUDENT IN AN ORGANIZED HEALTH CARE EDUCATION/TRAINING PROGRAM

## 2024-02-12 PROCEDURE — 99396 PREV VISIT EST AGE 40-64: CPT | Performed by: STUDENT IN AN ORGANIZED HEALTH CARE EDUCATION/TRAINING PROGRAM

## 2024-02-12 PROCEDURE — 80061 LIPID PANEL: CPT | Performed by: STUDENT IN AN ORGANIZED HEALTH CARE EDUCATION/TRAINING PROGRAM

## 2024-02-12 PROCEDURE — 36415 COLL VENOUS BLD VENIPUNCTURE: CPT | Performed by: STUDENT IN AN ORGANIZED HEALTH CARE EDUCATION/TRAINING PROGRAM

## 2024-02-13 LAB
ALBUMIN SERPL-MCNC: 4.3 G/DL (ref 3.5–5.2)
ALBUMIN/GLOB SERPL: 1.7 G/DL
ALP SERPL-CCNC: 70 U/L (ref 39–117)
ALT SERPL W P-5'-P-CCNC: 54 U/L (ref 1–41)
ANION GAP SERPL CALCULATED.3IONS-SCNC: 13.4 MMOL/L (ref 5–15)
AST SERPL-CCNC: 28 U/L (ref 1–40)
BILIRUB SERPL-MCNC: 0.3 MG/DL (ref 0–1.2)
BUN SERPL-MCNC: 14 MG/DL (ref 6–20)
BUN/CREAT SERPL: 11.2 (ref 7–25)
CALCIUM SPEC-SCNC: 9.3 MG/DL (ref 8.6–10.5)
CHLORIDE SERPL-SCNC: 104 MMOL/L (ref 98–107)
CHOLEST SERPL-MCNC: 211 MG/DL (ref 0–200)
CO2 SERPL-SCNC: 23.6 MMOL/L (ref 22–29)
CREAT SERPL-MCNC: 1.25 MG/DL (ref 0.76–1.27)
EGFRCR SERPLBLD CKD-EPI 2021: 73.7 ML/MIN/1.73
GLOBULIN UR ELPH-MCNC: 2.6 GM/DL
GLUCOSE SERPL-MCNC: 90 MG/DL (ref 65–99)
HDLC SERPL-MCNC: 30 MG/DL (ref 40–60)
LDLC SERPL CALC-MCNC: 114 MG/DL (ref 0–100)
LDLC/HDLC SERPL: 3.49 {RATIO}
POTASSIUM SERPL-SCNC: 4.1 MMOL/L (ref 3.5–5.2)
PROT SERPL-MCNC: 6.9 G/DL (ref 6–8.5)
SODIUM SERPL-SCNC: 141 MMOL/L (ref 136–145)
TRIGL SERPL-MCNC: 382 MG/DL (ref 0–150)
VLDLC SERPL-MCNC: 67 MG/DL (ref 5–40)